# Patient Record
Sex: MALE | Race: ASIAN | NOT HISPANIC OR LATINO
[De-identification: names, ages, dates, MRNs, and addresses within clinical notes are randomized per-mention and may not be internally consistent; named-entity substitution may affect disease eponyms.]

---

## 2017-11-13 ENCOUNTER — RECORDS - HEALTHEAST (OUTPATIENT)
Dept: ADMINISTRATIVE | Facility: OTHER | Age: 57
End: 2017-11-13

## 2017-11-14 ENCOUNTER — OFFICE VISIT - HEALTHEAST (OUTPATIENT)
Dept: FAMILY MEDICINE | Facility: CLINIC | Age: 57
End: 2017-11-14

## 2017-11-14 DIAGNOSIS — M54.16 LUMBAR RADICULITIS: ICD-10-CM

## 2017-11-14 RX ORDER — HYDROCODONE BITARTRATE AND ACETAMINOPHEN 5; 325 MG/1; MG/1
1 TABLET ORAL EVERY 4 HOURS PRN
Qty: 20 TABLET | Refills: 0 | Status: SHIPPED | OUTPATIENT
Start: 2017-11-14

## 2017-11-14 RX ORDER — REPAGLINIDE 2 MG/1
4 TABLET ORAL
Status: SHIPPED | COMMUNITY
Start: 2017-05-18

## 2017-11-14 RX ORDER — GLIPIZIDE 10 MG/1
10 TABLET, FILM COATED, EXTENDED RELEASE ORAL
Status: SHIPPED | COMMUNITY
Start: 2017-06-29

## 2017-11-14 RX ORDER — LOSARTAN POTASSIUM AND HYDROCHLOROTHIAZIDE 25; 100 MG/1; MG/1
TABLET ORAL
Status: SHIPPED | COMMUNITY
Start: 2017-06-29 | End: 2018-06-29

## 2017-11-14 RX ORDER — ASPIRIN 81 MG/1
TABLET, CHEWABLE ORAL
Status: SHIPPED | COMMUNITY
Start: 2017-03-29

## 2017-11-14 RX ORDER — GABAPENTIN 100 MG/1
CAPSULE ORAL
Refills: 3 | Status: SHIPPED | COMMUNITY
Start: 2017-10-27

## 2017-11-14 RX ORDER — GLUCOSAMINE HCL/CHONDROITIN SU 500-400 MG
CAPSULE ORAL
Status: SHIPPED | COMMUNITY
Start: 2017-06-29

## 2017-11-14 RX ORDER — SIMVASTATIN 20 MG
20 TABLET ORAL
Status: SHIPPED | COMMUNITY
Start: 2017-06-29

## 2017-11-14 RX ORDER — LANCETS 30 GAUGE
EACH MISCELLANEOUS
Status: SHIPPED | COMMUNITY
Start: 2017-06-29

## 2017-11-14 ASSESSMENT — MIFFLIN-ST. JEOR: SCORE: 1455.36

## 2018-01-17 ENCOUNTER — OFFICE VISIT - HEALTHEAST (OUTPATIENT)
Dept: FAMILY MEDICINE | Facility: CLINIC | Age: 58
End: 2018-01-17

## 2018-01-17 DIAGNOSIS — J20.9 ACUTE BRONCHITIS: ICD-10-CM

## 2018-04-18 ENCOUNTER — RECORDS - HEALTHEAST (OUTPATIENT)
Dept: ADMINISTRATIVE | Facility: OTHER | Age: 58
End: 2018-04-18

## 2021-05-31 VITALS — BODY MASS INDEX: 25.07 KG/M2 | HEIGHT: 66 IN | WEIGHT: 156 LBS

## 2021-05-31 VITALS — BODY MASS INDEX: 26.63 KG/M2 | WEIGHT: 165 LBS

## 2021-06-14 NOTE — PROGRESS NOTES
Chief Complaint   Patient presents with     Leg Pain     Left foot pain       HPI:    Patient is here for a month of left lower back pain radiating down his left buttock and proximal thigh, and left lower leg pain, worsened after a steroid injection by [facility] about 15 days ago. Now the pain is constant, moderate with OTC analgesics, but becomes severe without OTC analgesics, associated with tingling and numbness of the left leg. Pain worsens with walking. No other injury to back. NO fever, chills, urinary and bowel problems.     ROS: Pertinent ROS noted in HPI.     Allergies   Allergen Reactions     Ace Inhibitors Cough       Patient Active Problem List   Diagnosis     Chest Pain     Essential Hypertension     Hyperlipidemia     Fatty Liver     Type 2 Diabetes Mellitus       No family history on file.    Social History     Social History     Marital status:      Spouse name: N/A     Number of children: N/A     Years of education: N/A     Occupational History     Not on file.     Social History Main Topics     Smoking status: Never Smoker     Smokeless tobacco: Never Used     Alcohol use Not on file     Drug use: Not on file     Sexual activity: Not on file     Other Topics Concern     Not on file     Social History Narrative     No narrative on file         Objective:    Vitals:    11/14/17 1302   BP: 110/60   Pulse: 88   Resp: 18   Temp: 98.1  F (36.7  C)   SpO2: 96%       Gen:NAD  CV: RRR, no M, R, G  Pulm: CTAB, normal effort  MSK: normal inspection of back, no pain to palpation of C, T, and L spines. Moderate pain at left para-lumbar area, left buttock, proximal lateral left thigh, and left calf. Full ROM of hips, knees, ankles. Reduced 4/5 strength at left lower extremity due to pain. Limping on left leg.  Neuro: 2+ patellar and achilles DTRs bilaterally.         Lumbar radiculitis  -     HYDROcodone-acetaminophen 5-325 mg per tablet; Take 1 tablet by mouth every 4 (four) hours as needed for  pain.        Patient has an appt with his primary care 11/17. Symptoms appeared to aggravate after the injection so I advised f/u with [facility]. Cautions given on taking Norco.

## 2021-06-15 NOTE — PROGRESS NOTES
Assessment:       Acute Bronchitis       Plan:       Explained lack of efficacy of antibiotics in viral disease.  Antitussives per medication orders.  Trial of steroid nasal spray.   Discussed signs of worsening infection and when to follow-up with PCP if no symptom improvement.      Patient Instructions   You were seen today for acute bronchitis. This is likely due to a viral illness.    Symptom management:  - Get plenty of rest  - Avoid smoking and second hand smoke  - May take tylenol or ibuprofen for fever/discomfort  - Drink plenty of non-caffeinated fluids  - Use nasal steroid spray for sinus congestion  - May use tessalon perles to help suppress the cough      Reasons to be seen in the emergency room:  - Develop a fever of 100.4 or higher  - Cough changes, coughing up blood, or become short of breath  - Neck stiffness  - Chest pain  - Severe headache  - Unable to tolerate eating or drinking fluids    Otherwise, if no symptom improvement after 5 days, follow-up with your primary care provider.      Subjective:        History provided by patient and son, with son interpreting.  Whitney Mares is a 57 y.o. male here for evaluation of a cough.  The cough is non-productive with chest pain during cough. Onset of symptoms was 1 week ago, rapidly worsening since yesterday.  Associated symptoms include rhinorrhea and ear pain. Patient does not have a history of asthma. Patient has not had recent travel. Patient does not have a history of smoking. Denies fever and body aches    The following portions of the patient's history were reviewed and updated as appropriate: allergies, current medications and problem list.    Review of Systems  Pertinent items are noted in HPI.     Allergies  Allergies   Allergen Reactions     Ace Inhibitors Cough          Objective:       /64  Pulse 86  Temp 98.1  F (36.7  C)  Wt 165 lb (74.8 kg)  SpO2 97%  BMI 26.63 kg/m2  General appearance: alert, appears stated age, cooperative,  no distress and non-toxic  Head: Normocephalic, without obvious abnormality, atraumatic, sinuses nontender to percussion  Ears: TM's intact with mucoid fluid, no erythema, no bulgng; external ears normal  Nose: no discharge  Throat: no tonsil swelling, erythema, or exudate; MMM, lips and tongue normal  Neck: no adenopathy and supple, symmetrical, trachea midline  Lungs: inspiratory and expiratory wheezing heard throughout; rhonchi heard throughout  Heart: regular rate and rhythm, S1, S2 normal, no murmur, click, rub or gallop    Imaging    Xr Chest 2 Views    Result Date: 1/17/2018  EXAM DATE:         01/17/2018 [facility] X-RAY CHEST, 2 VIEWS, FRONTAL AND LATERAL 1/17/2018 3:00 PM INDICATION: cough x 1 week with acute worsening; ruleout pneum COMPARISON: None. FINDINGS: Left lung and chest wall axillary clips. Left central lung mild scar. Heart size appears normal. No edema, infiltrate, or effusion.     I personally reviewed and discussed findings with the patient.